# Patient Record
Sex: FEMALE | Race: WHITE | NOT HISPANIC OR LATINO | Employment: STUDENT | URBAN - METROPOLITAN AREA
[De-identification: names, ages, dates, MRNs, and addresses within clinical notes are randomized per-mention and may not be internally consistent; named-entity substitution may affect disease eponyms.]

---
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
## 2018-06-26 ENCOUNTER — OFFICE VISIT (OUTPATIENT)
Dept: CARDIOLOGY CLINIC | Facility: CLINIC | Age: 18
End: 2018-06-26
Payer: COMMERCIAL

## 2018-06-26 VITALS
SYSTOLIC BLOOD PRESSURE: 102 MMHG | HEIGHT: 68 IN | RESPIRATION RATE: 16 BRPM | DIASTOLIC BLOOD PRESSURE: 76 MMHG | HEART RATE: 45 BPM | WEIGHT: 147 LBS | BODY MASS INDEX: 22.28 KG/M2

## 2018-06-26 DIAGNOSIS — Z86.79 HISTORY OF CARDIAC MURMUR: Primary | ICD-10-CM

## 2018-06-26 PROCEDURE — 99203 OFFICE O/P NEW LOW 30 MIN: CPT | Performed by: INTERNAL MEDICINE

## 2018-06-26 PROCEDURE — 93000 ELECTROCARDIOGRAM COMPLETE: CPT | Performed by: INTERNAL MEDICINE

## 2018-06-26 NOTE — LETTER
2018     Alanis Kaur MD  [address]  [address]  [place] [id number]    Patient: Elana Lopes   YOB: 2000   Date of Visit: 2018       Dear Dr Kasi Sarmiento: Thank you for referring Elana Lopes to me for evaluation  Below are my notes for this consultation  If you have questions, please do not hesitate to call me  I look forward to following your patient along with you  Sincerely,        Kandi Tony MD        CC: No Recipients  Kandi Tony MD  2018  1:24 PM  Sign at close encounter  [address] [facility] [place]  [address]  [facility] [address] [facility]  [phone]    Cardiology New Patient     Patient:  Elana Lopes  :  2000  MRN:  [id number]    History of Present Illness:     73-year-old female with history of cardiac murmur and probably moderate mitral regurgitation on echocardiogram presents for new patient cardiology evaluation  She is a  at a small school in [employer]  She notes no functional limitations and denies any chest pain, shortness of breath, palpitations, dizziness, or syncope  She lives outside of [place] and has seen a cardiologist there-her dad works near here  Right now she is conditioning for her season  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     There is no problem list on file for this patient  Past Surgical History  No past surgical history on file  Social History   Social History     Social History    Marital status: Single     Spouse name: N/A    Number of children: N/A    Years of education: N/A     Occupational History    Not on file       Social History Main Topics    Smoking status: Never Smoker    Smokeless tobacco: Never Used    Alcohol use Not on file    Drug use: Unknown    Sexual activity: Not on file     Other Topics Concern    Not on file     Social History Narrative    No narrative on file        No Known Allergies    Family History   No family history on file  Review of Systems:  Review of Systems   Constitutional: Negative for chills, fatigue and fever  HENT: Negative for hearing loss, nosebleeds and tinnitus  Eyes: Negative for pain  Respiratory: Negative for cough, chest tightness and shortness of breath  Cardiovascular: Negative for chest pain, palpitations and leg swelling  Gastrointestinal: Negative for abdominal pain, nausea and vomiting  Endocrine: Negative for cold intolerance and heat intolerance  Genitourinary: Negative for difficulty urinating, hematuria and vaginal bleeding  Musculoskeletal: Negative for arthralgias  Skin: Negative for rash  Allergic/Immunologic: Negative for environmental allergies  Neurological: Negative for dizziness, syncope, weakness and light-headedness  Psychiatric/Behavioral: Negative for decreased concentration and sleep disturbance  The patient is not nervous/anxious  No current outpatient prescriptions on file  Physical Exam:    Vitals:    06/26/18 1248   BP: 102/76   Pulse: (!) 45   Resp: 16   Weight: 66 7 kg (147 lb)   Height: 5' 8" (1 727 m)       Physical Exam   Constitutional: She is oriented to person, place, and time  She appears well-developed and well-nourished  HENT:   Head: Normocephalic  Right Ear: External ear normal    Left Ear: External ear normal    Mouth/Throat: Oropharynx is clear and moist    Eyes: Pupils are equal, round, and reactive to light  Neck: No JVD present  Carotid bruit is not present  Cardiovascular: Normal rate, regular rhythm and intact distal pulses  Exam reveals no gallop and no friction rub  No murmur heard  Pulmonary/Chest: Effort normal and breath sounds normal  No tachypnea  No respiratory distress  She has no wheezes  She has no rales  She exhibits no tenderness  Abdominal: Soft  She exhibits no distension  There is no tenderness  There is no rebound and no guarding  Musculoskeletal: She exhibits no edema  Neurological: She is alert and oriented to person, place, and time  Skin: Skin is warm and dry  Psychiatric: She has a normal mood and affect  Her behavior is normal  Judgment and thought content normal    Nursing note and vitals reviewed  Labs:not applicable    Assessment/Plan:    1  Moderate mitral regurgitation-she has no functional limitations and may proceed with her soccer season as usual   I would like to see her back in about 6 months with an echocardiogram at that time and we will follow up with approximately yearly echoes in the future  2   Very small pericardial effusion-will follow up on this next time  Thank you so much, please do not hesitate to contact me if there any questions or concerns        Yudy aGrland MD  6/26/2018  1:16 PM

## 2018-06-26 NOTE — PROGRESS NOTES
[address] [facility] [place]  [address]  [facility] [address], 98 [facility]  [phone]    Cardiology New Patient     Patient:  Ольга Wilkinson  :  2000  MRN:  [id number]    History of Present Illness:     78-year-old female with history of cardiac murmur and probably moderate mitral regurgitation on echocardiogram presents for new patient cardiology evaluation  She is a  at a small school in [employer]  She notes no functional limitations and denies any chest pain, shortness of breath, palpitations, dizziness, or syncope  She lives outside of [place] and has seen a cardiologist there-her dad works near here  Right now she is conditioning for her season  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     There is no problem list on file for this patient  Past Surgical History  No past surgical history on file  Social History   Social History     Social History    Marital status: Single     Spouse name: N/A    Number of children: N/A    Years of education: N/A     Occupational History    Not on file  Social History Main Topics    Smoking status: Never Smoker    Smokeless tobacco: Never Used    Alcohol use Not on file    Drug use: Unknown    Sexual activity: Not on file     Other Topics Concern    Not on file     Social History Narrative    No narrative on file        No Known Allergies    Family History   No family history on file  Review of Systems:  Review of Systems   Constitutional: Negative for chills, fatigue and fever  HENT: Negative for hearing loss, nosebleeds and tinnitus  Eyes: Negative for pain  Respiratory: Negative for cough, chest tightness and shortness of breath  Cardiovascular: Negative for chest pain, palpitations and leg swelling  Gastrointestinal: Negative for abdominal pain, nausea and vomiting  Endocrine: Negative for cold intolerance and heat intolerance     Genitourinary: Negative for difficulty urinating, hematuria and vaginal bleeding  Musculoskeletal: Negative for arthralgias  Skin: Negative for rash  Allergic/Immunologic: Negative for environmental allergies  Neurological: Negative for dizziness, syncope, weakness and light-headedness  Psychiatric/Behavioral: Negative for decreased concentration and sleep disturbance  The patient is not nervous/anxious  No current outpatient prescriptions on file  Physical Exam:    Vitals:    06/26/18 1248   BP: 102/76   Pulse: (!) 45   Resp: 16   Weight: 66 7 kg (147 lb)   Height: 5' 8" (1 727 m)       Physical Exam   Constitutional: She is oriented to person, place, and time  She appears well-developed and well-nourished  HENT:   Head: Normocephalic  Right Ear: External ear normal    Left Ear: External ear normal    Mouth/Throat: Oropharynx is clear and moist    Eyes: Pupils are equal, round, and reactive to light  Neck: No JVD present  Carotid bruit is not present  Cardiovascular: Normal rate, regular rhythm and intact distal pulses  Exam reveals no gallop and no friction rub  No murmur heard  Pulmonary/Chest: Effort normal and breath sounds normal  No tachypnea  No respiratory distress  She has no wheezes  She has no rales  She exhibits no tenderness  Abdominal: Soft  She exhibits no distension  There is no tenderness  There is no rebound and no guarding  Musculoskeletal: She exhibits no edema  Neurological: She is alert and oriented to person, place, and time  Skin: Skin is warm and dry  Psychiatric: She has a normal mood and affect  Her behavior is normal  Judgment and thought content normal    Nursing note and vitals reviewed  Labs:not applicable    Assessment/Plan:    1    Moderate mitral regurgitation-she has no functional limitations and may proceed with her soccer season as usual   I would like to see her back in about 6 months with an echocardiogram at that time and we will follow up with approximately yearly fartun in the future  2   Very small pericardial effusion-will follow up on this next time  Thank you so much, please do not hesitate to contact me if there any questions or concerns        Bulmaro Figueroa MD  6/26/2018  1:16 PM

## 2018-12-20 ENCOUNTER — OFFICE VISIT (OUTPATIENT)
Dept: CARDIOLOGY CLINIC | Facility: CLINIC | Age: 18
End: 2018-12-20
Payer: COMMERCIAL

## 2018-12-20 ENCOUNTER — HOSPITAL ENCOUNTER (OUTPATIENT)
Dept: NON INVASIVE DIAGNOSTICS | Facility: CLINIC | Age: 18
Discharge: HOME | End: 2018-12-20
Payer: COMMERCIAL

## 2018-12-20 VITALS
HEIGHT: 68 IN | DIASTOLIC BLOOD PRESSURE: 72 MMHG | HEART RATE: 60 BPM | BODY MASS INDEX: 22.4 KG/M2 | SYSTOLIC BLOOD PRESSURE: 112 MMHG | RESPIRATION RATE: 16 BRPM | WEIGHT: 147.8 LBS

## 2018-12-20 DIAGNOSIS — I34.0 MITRAL VALVE INSUFFICIENCY, UNSPECIFIED ETIOLOGY: Primary | ICD-10-CM

## 2018-12-20 DIAGNOSIS — Z86.79 HISTORY OF CARDIAC MURMUR: ICD-10-CM

## 2018-12-20 PROCEDURE — 93306 TTE W/DOPPLER COMPLETE: CPT | Performed by: INTERNAL MEDICINE

## 2018-12-20 PROCEDURE — 93306 TTE W/DOPPLER COMPLETE: CPT

## 2018-12-20 PROCEDURE — 99213 OFFICE O/P EST LOW 20 MIN: CPT | Performed by: INTERNAL MEDICINE

## 2018-12-20 RX ORDER — NORETHINDRONE ACETATE AND ETHINYL ESTRADIOL AND FERROUS FUMARATE 1MG-20(21)
1 KIT ORAL DAILY
Refills: 8 | COMMUNITY
Start: 2018-12-13

## 2018-12-20 NOTE — PROGRESS NOTES
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  She is on break from her sophomore year at her school in [place]       There is no problem list on file for this patient  Past Surgical History  No past surgical history on file  Social History   Social History     Social History    Marital status: Single     Spouse name: N/A    Number of children: N/A    Years of education: N/A     Occupational History    Not on file  Social History Main Topics    Smoking status: Never Smoker    Smokeless tobacco: Never Used    Alcohol use Not on file    Drug use: Unknown    Sexual activity: Not on file     Other Topics Concern    Not on file     Social History Narrative    No narrative on file        No Known Allergies    Family History   No family history on file  Review of Systems:  Review of Systems   Constitutional: Negative for chills, fatigue and fever  HENT: Negative for hearing loss, nosebleeds and tinnitus  Eyes: Negative for pain  Respiratory: Negative for cough, chest tightness and shortness of breath  Cardiovascular: Negative for chest pain, palpitations and leg swelling  Gastrointestinal: Negative for abdominal pain, nausea and vomiting  Endocrine: Negative for cold intolerance and heat intolerance  Genitourinary: Negative for difficulty urinating, hematuria and vaginal bleeding  Musculoskeletal: Negative for arthralgias  Skin: Negative for rash  Allergic/Immunologic: Negative for environmental allergies     Neurological: Negative for dizziness, syncope, weakness and light-headedness  Psychiatric/Behavioral: Negative for decreased concentration and sleep disturbance  The patient is not nervous/anxious  Current Outpatient Prescriptions:     JUNEL FE 1/20 1-20 MG-MCG per tablet, Take 1 tablet by mouth daily, Disp: , Rfl: 8     Physical Exam:    Vitals:    12/20/18 1442   BP: 112/72   Pulse: 60   Resp: 16   Weight: 67 kg (147 lb 12 8 oz)   Height: 5' 8" (1 727 m)       Physical Exam   Constitutional: She is oriented to person, place, and time  She appears well-developed and well-nourished  HENT:   Head: Normocephalic  Right Ear: External ear normal    Left Ear: External ear normal    Mouth/Throat: Oropharynx is clear and moist    Eyes: Pupils are equal, round, and reactive to light  Neck: No JVD present  Carotid bruit is not present  Cardiovascular: Normal rate, regular rhythm and intact distal pulses  Exam reveals no gallop and no friction rub  No murmur heard  Pulmonary/Chest: Effort normal and breath sounds normal  No tachypnea  No respiratory distress  She has no wheezes  She has no rales  She exhibits no tenderness  Abdominal: Soft  She exhibits no distension  There is no tenderness  There is no rebound and no guarding  Musculoskeletal: She exhibits no edema  Neurological: She is alert and oriented to person, place, and time  Skin: Skin is warm and dry  Psychiatric: She has a normal mood and affect  Her behavior is normal  Judgment and thought content normal    Nursing note and vitals reviewed  Labs:not applicable    Assessment/Plan:    1  Mild mitral regurgitation-I do think she can have an echocardiogram about 3 years from now and if that is stable she can continue every 3-5 years with an echocardiogram     2   Very small pericardial effusion  I would recommend thyroid function test if not recently done  She has no symptoms that would suggest a rheumatologic diagnosis      This has not progressed in size since the last echocardiogram     I did say she can follow up with echocardiogram in about 3 years  Thank you so much, please do not hesitate to contact me if there any questions or concerns        Erving Epley, MD  12/20/2018  2:59 PM

## 2018-12-20 NOTE — LETTER
2018     Brain MD Yolanda  [address] [facility]  [address]  [place] [address]    Patient: Gibson Calix   YOB: 2000   Date of Visit: 2018       Dear Dr Christie Tate: Thank you for referring Gibson Calix to me for evaluation  Below are my notes for this consultation  If you have questions, please do not hesitate to call me  I look forward to following your patient along with you  Sincerely,        Martha Summers MD        CC: No Recipients  Martha Summers MD  2018  3:12 PM  Sign at close encounter  [address] [facility] [place]  [address]  [facility] [facility]  [phone]    Cardiology Follow up    Patient:  Gibson Calix  :  2000  MRN:  [id number]    History of Present Illness:     10-year-old female with mild mitral regurgitation and small pericardial effusion presents for follow-up  She denies any chest pain, shortness of breath, palpitations, dizziness, or syncope  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  She is on break from her sophomore year at her school in [place]       There is no problem list on file for this patient  Past Surgical History  No past surgical history on file  Social History   Social History     Social History    Marital status: Single     Spouse name: N/A    Number of children: N/A    Years of education: N/A     Occupational History    Not on file  Social History Main Topics    Smoking status: Never Smoker    Smokeless tobacco: Never Used    Alcohol use Not on file    Drug use: Unknown    Sexual activity: Not on file     Other Topics Concern    Not on file     Social History Narrative    No narrative on file        No Known Allergies    Family History   No family history on file  Review of Systems:  Review of Systems   Constitutional: Negative for chills, fatigue and fever  HENT: Negative for hearing loss, nosebleeds and tinnitus  Eyes: Negative for pain  Respiratory: Negative for cough, chest tightness and shortness of breath  Cardiovascular: Negative for chest pain, palpitations and leg swelling  Gastrointestinal: Negative for abdominal pain, nausea and vomiting  Endocrine: Negative for cold intolerance and heat intolerance  Genitourinary: Negative for difficulty urinating, hematuria and vaginal bleeding  Musculoskeletal: Negative for arthralgias  Skin: Negative for rash  Allergic/Immunologic: Negative for environmental allergies  Neurological: Negative for dizziness, syncope, weakness and light-headedness  Psychiatric/Behavioral: Negative for decreased concentration and sleep disturbance  The patient is not nervous/anxious  Current Outpatient Prescriptions:     JUNEL FE 1/20 1-20 MG-MCG per tablet, Take 1 tablet by mouth daily, Disp: , Rfl: 8     Physical Exam:    Vitals:    12/20/18 1442   BP: 112/72   Pulse: 60   Resp: 16   Weight: 67 kg (147 lb 12 8 oz)   Height: 5' 8" (1 727 m)       Physical Exam   Constitutional: She is oriented to person, place, and time  She appears well-developed and well-nourished  HENT:   Head: Normocephalic  Right Ear: External ear normal    Left Ear: External ear normal    Mouth/Throat: Oropharynx is clear and moist    Eyes: Pupils are equal, round, and reactive to light  Neck: No JVD present  Carotid bruit is not present  Cardiovascular: Normal rate, regular rhythm and intact distal pulses  Exam reveals no gallop and no friction rub  No murmur heard  Pulmonary/Chest: Effort normal and breath sounds normal  No tachypnea  No respiratory distress  She has no wheezes  She has no rales  She exhibits no tenderness  Abdominal: Soft  She exhibits no distension  There is no tenderness  There is no rebound and no guarding  Musculoskeletal: She exhibits no edema  Neurological: She is alert and oriented to person, place, and time  Skin: Skin is warm and dry  Psychiatric: She has a normal mood and affect  Her behavior is normal  Judgment and thought content normal    Nursing note and vitals reviewed  Labs:not applicable    Assessment/Plan:    1  Mild mitral regurgitation-I do think she can have an echocardiogram about 3 years from now and if that is stable she can continue every 3-5 years with an echocardiogram     2   Very small pericardial effusion  I would recommend thyroid function test if not recently done  She has no symptoms that would suggest a rheumatologic diagnosis  This has not progressed in size since the last echocardiogram     I did say she can follow up with echocardiogram in about 3 years  Thank you so much, please do not hesitate to contact me if there any questions or concerns        Germain Landin MD  12/20/2018  2:59 PM